# Patient Record
(demographics unavailable — no encounter records)

---

## 2017-01-01 NOTE — PN
Date/Time of Note


Date/Time of Note


DATE: 17 


TIME: 12:32





[place] SOAP


Subjective Findings


Other Findings


Infant is breast-feeding well at 3.9% weight loss void and stool normal.


Mother with questionable GBS treated with 1 dose of antibiotics 1 hour prior to 

discharge no clinical signs or symptoms of infection


We will check bilirubin prior to discharge


Hearing screen and congenital heart disease screen prior to discharge





Vital Signs


Vital Signs





 Vital Signs








  Date Time  Temp Pulse Resp B/P Pulse Ox O2 Delivery O2 Flow Rate FiO2


 


17 08:00 99.6 140 42     





NPASS Score-Pain: 0





Physical Exam


HEENT:  [place] open,soft,flat, Normocephalic


Lungs:  Clear to auscultation


Heart:  Regular R&R, No murmur


Abdomen:  Soft, No hepatosplenomegaly, No masses


Skin:  No rashes, [place]





Assessment


Term [place]:  Boy


Assessment:  AGA,  Jaundice





Plan


Routine  care


Bilirubin prior to discharge


Monitor for clinical signs or symptoms of infection


Hearing screen and congenital heart disease screen prior to discharge











YUSEF NOLEN MD May 27, 2017 12:34

## 2017-01-01 NOTE — ERD
ER Documentation


Chief Complaint


Date/Time


DATE: 9/8/17 


TIME: 21:34


Chief Complaint


Fever and Diarrhea x2 days





HPI


3-month-old male otherwise healthy and up-to-date vaccinations presents with 

his mother for fever, cough, diarrhea and runny nose for 2 days.  Mother states 

that the temperature maximum was 100.1, and he received Tylenol which is about 3

-1/2 hours ago.  She reports up to 4 episodes of diarrhea, making wet diapers 

and taking breast milk well.  Child has not had any episodes of vomiting.





ROS


All systems reviewed and are negative except as per history of present illness.





Medications


Home Meds


Active Scripts


Electrolytes (Pedialyte Advanced Care) 1,000 Ml Solution, 100 ML PO TID, #1000 

ML


   Prov:ALICIA MARTÍNEZ PA-C         9/8/17


Acetaminophen* (Acetaminophen* Susp) 160 Mg/5 Ml Oral.susp, 3 ML PO Q4H Y for 

PAIN OR FEVER, #1 BOTTLE


   Prov:ALICIA MARTÍNEZ PA-C         9/8/17


Glycerin* (Glycerin (Pediatric)*) 1 Each Supp.rect, 1 EACH [place] DAILY, #3 

SUPP.RECT


   Prov:OSCAR CARVALHO         7/9/17





Allergies


Allergies:  


Coded Allergies:  


     No Known Allergy (Unverified , 7/14/17)





PMhx/Soc


History of Surgery:  No


Anesthesia Reaction:  No


Hx Neurological Disorder:  No


Hx Respiratory Disorders:  No


Hx Cardiac Disorders:  No


Hx Psychiatric Problems:  No


Hx Alcohol Use:  No


Hx Substance Use:  No


Hx Tobacco Use:  No


Smoking Status:  Never smoker





Physical Exam


Vitals





Vital Signs








  Date Time  Temp Pulse Resp B/P Pulse Ox O2 Delivery O2 Flow Rate FiO2


 


9/8/17 19:33 99.0 165 28  100   








Physical Exam


 Const:      Well-developed, well-nourished, in no acute distress.


HEENT:     Atraumatic. Normal Conjunctiva. TM's normal bilaterally, clear 

oropharynx. Supple. Full range of motion.  No meningismus.


 Resp:       Clear to auscultation bilaterally


 Cardio:    Regular rate and rhythm, no murmurs


 Abd:         Soft, non tender, non distended. Normal bowel sounds. No McBurney'

s point tenderness. No guarding or rigidity. No peritoneal signs.


 Skin:        No petechia or rashes


 Back:      No midline or flank tenderness


 Ext:        No cyanosis, or edema


 Neur:      Awake and alert, appropriate for age





Procedures/MDM


The patient is a 3-month-old female who comes in with diarrhea, cough, fever, 

rhinorrhea for 2 days.  Patient appears well, without signs of dehydration.  

The patient has a differential diagnosis of a viral upper respiratory infection

, bacterial upper respiratory infection, bronchitis, pneumonia, pharyngitis, 

laryngitis, epiglottitis, croup, pneumonia. Patient has a normal pulmonary 

examination, clear breath sounds, normal pulse oximetry, with no corrective 

measures needed at this time. Fluids, rest, antipyretics were encouraged.





Departure


Diagnosis:  


 Primary Impression:  


 Diarrhea


Condition:  Good


Patient Instructions:  Diarrhea, Viral (Infant/Toddler)











ALICIA MARTÍNEZ PA-C Sep 8, 2017 21:36

## 2017-01-01 NOTE — RADRPT
PROCEDURE:   XR Abdomen. 

 

CLINICAL INDICATION:   Abdomen pain. 

 

TECHNIQUE:   AP supine abdomen x-ray. 

 

COMPARISON:   None. 

 

FINDINGS:

There is a large amount of stool throughout the colon consistent with constipation.  The bowel gas p
attern is otherwise normal. 

There is no evidence of obstruction.  

There are no abnormal calcifications overlying the urinary tracts. 

The osseus structures are unremarkable.   

 

IMPRESSION:

1.  Constipation.

2.  Otherwise unremarkable abdomen radiograph.  

 

RPTAT: QQ

_____________________________________________ 

.Balbir Quiñones MD, MD           Date    Time 

Electronically viewed and signed by .Balbir Quiñones MD, MD on 2017 23:14 

 

D:  2017 23:14  T:  2017 23:14

.R/

## 2017-01-01 NOTE — ERD
ER Documentation


Chief Complaint


Chief Complaint


cough x 4 days, watery eyes





HPI


This 6 month old male BIB parents for evaluation for cough and fever





ROS


All systems reviewed and are negative except as per history of present illness.





Medications


Home Meds


Active Scripts


Acetaminophen* (Acetaminophen* Susp) 160 Mg/5 Ml Oral.susp, 4.5 ML PO Q4H Y for 

PAIN OR FEVER, #1 BOTTLE


   Prov:HOMERO SCHULTZ         11/26/17


Acetaminophen* (Acetaminophen* Susp) 160 Mg/5 Ml Oral.susp, 4 ML PO Q6H Y for 

PAIN OR FEVER, #1 BOTTLE


   Prov:VINICIO DANIELS PA-C         11/12/17


Electrolytes (Pedialyte Advanced Care) 1,000 Ml Solution, 100 ML PO TID, #1000 

ML


   Prov:ALICIA MARTÍNEZ PA-C         9/8/17


Acetaminophen* (Acetaminophen* Susp) 160 Mg/5 Ml Oral.susp, 3 ML PO Q4H Y for 

PAIN OR FEVER, #1 BOTTLE


   Prov:ALICIA MARTÍNEZ PA-C         9/8/17


Glycerin* (Glycerin (Pediatric)*) 1 Each Supp.rect, 1 EACH [place] DAILY, #3 

SUPP.RECT


   Prov:OSCAR CARVALHO         7/9/17





Allergies


Allergies:  


Coded Allergies:  


     No Known Allergy (Unverified , 7/14/17)





PMhx/Soc


History of Surgery:  No


Anesthesia Reaction:  No


Hx Neurological Disorder:  No


Hx Respiratory Disorders:  No


Hx Cardiac Disorders:  No


Hx Psychiatric Problems:  No


Hx Alcohol Use:  No


Hx Substance Use:  No


Hx Tobacco Use:  No


Smoking Status:  Never smoker





Physical Exam


Vitals





Vital Signs








  Date Time  Temp Pulse Resp B/P Pulse Ox O2 Delivery O2 Flow Rate FiO2


 


11/27/17 00:51 99.5       


 


11/27/17 00:30 100.7       


 


11/26/17 23:41 102.4       


 


11/26/17 22:50 102.4       


 


11/26/17 20:04 97.8 133 20  100   








Physical Exam


Const:    Well-nourished well-appearing well-hydrated-month-old male patient in 

no acute distress, patient is cooperative during exam, alert, breast-feeding 

without difficulty.


Head:   [place] flat


Eyes:    Normal Conjunctiva, right eye crusted shut with yellow discharge.  

Lids easily opened with gentle pressure applied


ENT:    Bilateral tympanic membranes partially obstructed with cerumen, no 

erythema noted, no fluid level, nasal mucosa is wet, pharynx is pink, tongue is 

midline, mucosas are moist.


Neck:               Full range of motion..~ No meningismus.


Resp:   Chest rises and falls symmetrically, no intercostal retractions, no 

upper airway stridor clear to auscultation bilaterally no wheezes or rhonchi


Neur:    Awake and alert age-appropriate


Psych:    Normal Mood and Affect


Results 24 hrs








 Laboratory Tests








Test


  11/27/17


01:24


 


Bedside Urine pH (LAB) 6.0 


 


Bedside Urine Protein (LAB) 1+ 


 


Bedside Urine Glucose (UA) Negative 


 


Bedside Urine Ketones (LAB) Negative 


 


Bedside Urine Blood 1+ 


 


Bedside Urine Nitrite (LAB) Negative 


 


Bedside Urine Leukocyte


Esterase (L Negative 


 








 Current Medications








 Medications


  (Trade)  Dose


 Ordered  Sig/Lupillo


 Route


 PRN Reason  Start Time


 Stop Time Status Last Admin


Dose Admin


 


 Acetaminophen


  (Tylenol Supp)  186 mg  ONCE  STAT


 MN


   11/26/17 22:50


 11/26/17 22:51 DC 11/26/17 22:54


 


 


 Sodium Chloride


  (NS)  180 ml  ONCE  ONCE


 IV*


   11/27/17 00:00


 11/27/17 00:01 DC  


 


 


 Ibuprofen


  (Motrin Liquid


  (Ped))  95 mg  ONCE  STAT


 PO


   11/26/17 23:45


 11/26/17 23:48 DC 11/26/17 23:51


 











Procedures/MDM


This 6-month-old male patient presents to emergency department along with 

brother for evaluation of cough, and fever.  Patient is interactive, alert, and 

fussy on exam, emergency room course includes history and physical exam 

unremarkable for pulmonary symptoms.  Patient's skin is hot to touch with a 

documented normal temperature, rectal temperature ordered, patient is febrile 

at 102.4.  Rectal Tylenol given, patient reassessed with little change in 

symptoms, ibuprofen  given.  Straight cath for urinalysis, urinalysis negative 

for evidence of infection, post ibuprofen assessment, fever is starting to 

reduce.  Patient remains in emergency department for additional 30 minutes for 

monitoring.  Plan to discharge home with diagnosis of fever, cough.  Continue 

ibuprofen, fluids, rest, return to emergency department if fever fails to 

improve as anticipated, follow-up with pediatrician in 24 hours.  Patient is 

stable with no new complaints during ER course, clinically there is no current 

evidence to suggest meningitis, sepsis, acute abdomen,, bowel obstruction 

influenza A, influenza B, pneumonia, urinary tract infection or any other 

emergent condition appearing to require further evaluation or hospitalization.  

I feel the patient is stable for discharge at this time.  I have discussed 

results, examination findings, the treatment plan with the patient and family 

present prior to discharge.  Indications for emergent reevaluation, side 

effects of medication were also discussed.  All questions were answered.  

Patient verbalizes understanding and agrees with plan of care.





Departure


Diagnosis:  


 Primary Impression:  


 Fever


 Fever type:  unspecified  Qualified Code:  R50.9 - Fever, unspecified fever 

cause


Condition:  Good


Patient Instructions:  Cough, Chronic, Uncertain Cause (Child)





Additional Instructions:  


Thank you for for coming to [facility] for your care today. 

Please ask your nurse or provider if you have questions about your care today 

and do not leave until all your questions have been answered.  Please use any 

medications given as directed and follow-up with your doctor (or the doctor you 

were referred to) in the next 2-3 days. If you do not have a primary care 

doctor you [place] [facility] [place] You may also 

use motrin and tylenol as needed for fever and/or pain unless instructed 

otherwise by your provider or nurse. Indications for more urgent follow-up have 

been discussed, but you may return to the Emergency Department at ANY time for 

any worrisome or worsening symptoms.





If you have abdominal pain, please know that no test or exam you received is 

perfect and you should follow up within 8 hours for continued pain.





If you had any imaging studies today, such as an X-Ray or CT Scan, these 

studies will be reviewed later by a radiologist. You will be called if there 

are important findings that were not identified today, so make sure the contact 

information you provided at registration is correct.





If you received any narcotic pain control medicine today, such as Vicodin, 

Morphine or Dilaudid, your coordination and judgment may be affected for a 

number of hours. Please do not drive or operate heavy machinery, and you may 

want someone to assist you at home. If you were given a prescription for 

narcotic medication, be aware that it is very addictive- use sparingly and only 

if necessary.











HOMERO SCHULTZ Nov 26, 2017 20:51

## 2017-01-01 NOTE — PD.NBNDCI
Provider Discharge Instruction


Pediatrician Information


Clinic Information


Dr Ulloa








Follow-up with Physician:   2





 3





 Day/Days











Diet


Breast Feeding Mothers:  Breast Feed Ad Shireen





Additional Instructions


Additional Infomation


Discharge home


Breast-feeding ad shireen. on demand at least every 3 hours


No medication


Follow-up with pediatrician in 2 or 3 days in the office of HEIDE Salas May 28, 2017 13:16

## 2017-01-01 NOTE — ERD
ER Documentation


Chief Complaint


Chief Complaint


FEVER AT HOME +COUGH/CONGESTION





HPI


5 month 17-day-old male patient with no significant past medical history 

presents to the ED complaining of fever, cough that started 4 days ago.  

Patient is up-to-date with his vaccinations.  Patient is eating appropriately, 

tolerating oral intake, has normal bowel movements and good urine output.  

Denies any wheezing, shortness of breath, nausea, vomiting, diarrhea, dysuria, 

urgency, frequency.





ROS


All systems reviewed and are negative except as per history of present illness.





Medications


Home Meds


Active Scripts


Acetaminophen* (Acetaminophen* Susp) 160 Mg/5 Ml Oral.susp, 4 ML PO Q6H Y for 

PAIN OR FEVER, #1 BOTTLE


   Prov:VINICIO DANIELS PA-C         11/12/17


Electrolytes (Pedialyte Advanced Care) 1,000 Ml Solution, 100 ML PO TID, #1000 

ML


   Prov:ALICIA MARTÍNEZ PA-C         9/8/17


Acetaminophen* (Acetaminophen* Susp) 160 Mg/5 Ml Oral.susp, 3 ML PO Q4H Y for 

PAIN OR FEVER, #1 BOTTLE


   Prov:ALICIA MARTÍNEZ PA-C         9/8/17


Glycerin* (Glycerin (Pediatric)*) 1 Each Supp.rect, 1 EACH [place] DAILY, #3 

SUPP.RECT


   Prov:OSCAR CARVALHO         7/9/17





Allergies


Allergies:  


Coded Allergies:  


     No Known Allergy (Unverified , 7/14/17)





PMhx/Soc


History of Surgery:  No


Anesthesia Reaction:  No


Hx Neurological Disorder:  No


Hx Respiratory Disorders:  No


Hx Cardiac Disorders:  No


Hx Psychiatric Problems:  No


Hx Alcohol Use:  No


Hx Substance Use:  No


Hx Tobacco Use:  No


Smoking Status:  Never smoker





Physical Exam


Vitals


Vital Signs








  Date Time  Temp Pulse Resp B/P Pulse Ox O2 Delivery O2 Flow Rate FiO2


 


11/12/17 16:41 99.2 149 32  99   








Physical Exam


Const: Non-ill-appearing, well-nourished. In no acute distress.


Head: Atraumatic, normocephalic 


Eyes: Normal Conjunctiva without injection. No purulent discharge. PERRL. EOMI 


ENT: Normal external ear. Ear canal without erythema. Tympanic membrane pearly 

gray without effusion or bulging. Nasal canal clear with normal turbinates. 

Moist oropharynx without tonsillar exudates. Non-erythematous pharynx. Uvula 

midline. No drooling.  No trismus. 


Neck: Full range of motion. No meningismus. No cervical lymphadenopathy. 


Resp: Clear to auscultation bilaterally. No wheezing, rhonchi, rales, or 

crackles. No accessory muscle use. No retractions.


Cardio: Regular rate and rhythm.  No murmurs, rubs or gallops.


Abd: Soft, non tender, non distended. Normal bowel sounds.  No palpable masses.

  No rebound tenderness.  No guarding.  


Skin: No petechiae or rashes


Back: No midline tenderness. No CVA tenderness.


Ext: No cyanosis, or edema. 


Neur: Awake and alert. 


Psych: Normal Mood and Affect





Procedures/MDM


5 month 17-day-old male patient with no significant past medical history 

presents to the ED complaining of cough, congestion, fever.  Patient is 

afebrile and nontoxic-appearing.  Patient has normal vital signs.  This patient 

presents to the ED with symptoms consistent with a viral acute upper 

respiratory infection.  Patient is afebrile and has normal vital signs.  Patient

's physical exam include lungs which were clear to auscultation and a normal 

pulse oximetry. There is a low suspicion for a croup, pneumonia, pneumothorax, 

cardiac tamponade, peritonsillar abscess, foreign body aspiration, mastoiditis, 

retropharyngeal abscess, epiglottitis, meningitis, sepsis or other emergent 

conditions. 





Discharge medications: Tylenol


Follow up with primary care physician in 1-2 days. Instructed patient to return 

to the ED sooner for any worsening symptoms. Patient's questions were answered. 

Patient understood and agreed with discharge plan. Patient discharged stable.





Departure


Diagnosis:  


 Primary Impression:  


 Cough


 Additional Impression:  


 Fever


 Fever type:  unspecified  Qualified Code:  R50.9 - Fever, unspecified fever 

cause


Condition:  Stable


Patient Instructions:  Uri, Viral, No Abx (Child)


Referrals:  


COMMUNITY CLINICS


YOU HAVE RECEIVED A MEDICAL SCREENING EXAM AND THE RESULTS INDICATE THAT YOU DO 

NOT HAVE A CONDITION THAT REQUIRES URGENT TREATMENT IN THE EMERGENCY DEPARTMENT.





FURTHER EVALUATION AND TREATMENT OF YOUR CONDITION CAN WAIT UNTIL YOU ARE SEEN 

IN YOUR DOCTORS OFFICE WITHIN THE NEXT 1-2 DAYS. IT IS YOUR RESPONSIBILITY TO 

MAKE AN APPOINTMENT FOR FOLOW-UP CARE.





IF YOU HAVE A PRIMARY DOCTOR


--you should call your primary doctor and schedule an appointment





IF YOU DO NOT HAVE A PRIMARY DOCTOR YOU CAN CALL OUR PHYSICIAN REFERRAL HOTLINE 

AT


 [phone] 





IF YOU CAN NOT AFFORD TO SEE A PHYSICIAN YOU CAN CHOSE FROM THE FOLLOWING 

[facility] CLINICS





[facility] [phone] [address] [place]





[facility] [phone] [address] [facility]





[facility] [phone] [address] [facility] [phone] [address] [facility] [phone] [address] [facility]. [facility] [phone] [address] [facility]. [facility]


YOU HAVE RECEIVED A MEDICAL SCREENING EXAM AND THE RESULTS INDICATE THAT YOU DO 

NOT HAVE A CONDITION THAT REQUIRES URGENT TREATMENT IN THE EMERGENCY DEPARTMENT.





FURTHER EVALUATION AND TREATMENT OF YOUR CONDITION CAN WAIT UNTIL YOU ARE SEEN 

IN YOUR DOCTORS OFFICE WITHIN THE NEXT 1-2 DAYS. IT IS YOUR RESPONSIBILITY TO 

MAKE AN APPOINTMENT FOR FOLOW-UP CARE.





IF YOU HAVE A PRIMARY DOCTOR


--you should call your primary doctor and schedule and appointment





IF YOU DO NOT HAVE A PRIMARY DOCTOR YOU CAN CALL OUR PHYSICIAN REFERRAL HOTLINE 

AT [phone].





IF YOU CAN NOT AFFORD TO SEE A PHYSICIAN YOU CAN CHOSE FROM THE FOLLOWING 

[facility] INSTITUTIONS:





[facility]


[address] [place] [address]





[facility]


[address] [place] [address]





[facility]


[address] [place] [address]





[facility] URGENT CARE/SPECIALTIES





Additional Instructions:  


Call your primary care doctor TOMORROW for an appointment during the next 2-3 

days.See the doctor sooner or return here if your condition worsens before your 

appointment time.











VINICIO DANIELS PA-C Nov 12, 2017 19:34


VINICIO DANIELS PA-C Nov 12, 2017 19:34

## 2017-01-01 NOTE — HP
Date/Time of Note


Date/Time of Note


DATE: 17 


TIME: 16:21





[place] Physical Examination


Infant History


YOB: 2017Time of Birth:  918


Sex:


male


Type of Delivery:  NORMAL VAGINAL DELIVERYBirth Weight (g):  3590Newborn Head 

Circumference:  33.0Length (in):  20.00APGAR Score:  9.9





Maternal Labs


Maternal Hepatitis B:  Negative


Maternal RPR/VDRL:  Nonreactive


Maternal Group Beta Strep:  Negative


Maternal Abx # of Dose(s):  1


Maternal Antibiotic last date:  May 26, 2017


Maternal Antibiotic Last time:  852


Mother's Blood Type:  A Positive





Admission Vital Signs





 Vital Signs








  Date Time  Temp Pulse Resp B/P Pulse Ox O2 Delivery O2 Flow Rate FiO2


 


17 11:00  124 40     











Labs/Micro





Blood Bank








Test


  17


09:18


 


Blood Type A POSITIVE 


 


Direct Antiglobulin Test


([place]) NEGATIVE 


 

















ROMEL CARR May 26, 2017 16:21

## 2017-01-01 NOTE — ERD
ER Documentation


Chief Complaint


Chief Complaint


1 diaper change today; cough, congestion since yesterday





HPI


6-month-old male presents here to emergency department for reevaluation.  

Patient was seen here yesterday with diagnosed with upper respiratory tract 

infection, started to have vomiting and diarrhea today.  Patient also has 

bilateral eye purulent discharge, both eyes are stuck in the morning.  Patient 

fever is more controlled at this time.  Patient has loss of appetite, not 

eating that much, also was vomiting, had only one diaper change today, but also 

had a urine output in the morning.





ROS


All systems reviewed and are negative except as per history of present illness.





Medications


Home Meds


Active Scripts


Ondansetron Hcl* (Ondansetron Hcl* Liq) 4 Mg/5 Ml Solution, 1 ML PO Q6H Y for 

NAUSEA AND/OR VOMITING, #2 OZ


   Prov:ONIEL BACK NP         11/28/17


Naphazoline-Pheniramine* (Visine-A*) 15 Ml Drops, 2 DROP BOTH EYES Q4H Y for 

RED EYES, #1 BOT


   Prov:ONIEL BACK NP         11/28/17


Polymyxin B Sulfate-TMP* (Polymyxin B-TMP Eye Drops*) 10 Ml Drops, 1 DROP BOTH 

EYES QID for 7 Days, EA


   Prov:ONIEL BACK NP         11/28/17


Acetaminophen* (Acetaminophen* Susp) 160 Mg/5 Ml Oral.susp, 4.5 ML PO Q4H Y for 

PAIN OR FEVER, #1 BOTTLE


   Prov:HOMERO SCHULTZ         11/26/17


Acetaminophen* (Acetaminophen* Susp) 160 Mg/5 Ml Oral.susp, 4 ML PO Q6H Y for 

PAIN OR FEVER, #1 BOTTLE


   Prov:VINICIO DANIELS PA-C         11/12/17


Electrolytes (Pedialyte Advanced Care) 1,000 Ml Solution, 100 ML PO TID, #1000 

ML


   Prov:ALICIA MARTÍNEZ PA-C         9/8/17


Acetaminophen* (Acetaminophen* Susp) 160 Mg/5 Ml Oral.susp, 3 ML PO Q4H Y for 

PAIN OR FEVER, #1 BOTTLE


   Prov:ALICIA MARTÍNEZ PA-C         9/8/17


Glycerin* (Glycerin (Pediatric)*) 1 Each Supp.rect, 1 EACH ND DAILY, #3 

SUPP.RECT


   Prov:OSCAR CARVALHO         7/9/17





Allergies


Allergies:  


Coded Allergies:  


     No Known Allergy (Unverified , 7/14/17)





PMhx/Soc


Immunization: Up-to-date


Medical and Surgical Hx:  pt denies Medical Hx, pt denies Surgical Hx


History of Surgery:  No


Anesthesia Reaction:  No


Hx Neurological Disorder:  No


Hx Respiratory Disorders:  No


Hx Cardiac Disorders:  No


Hx Psychiatric Problems:  No


Hx Alcohol Use:  No


Hx Substance Use:  No


Hx Tobacco Use:  No





FmHx


Family History:  No coronary disease, No diabetes, No other





Physical Exam


Vitals





Vital Signs








  Date Time  Temp Pulse Resp B/P Pulse Ox O2 Delivery O2 Flow Rate FiO2


 


11/27/17 23:14 97.6 136 26  96   








Physical Exam


GENERAL:  The child is well developed and nourished for age, interactive and 

vigorous appearing. No acute distress and nontoxic.


HEENT: Atraumatic.  Her eyes are PERRLA EOM intact, noted purulent discharge 

from both eyes, bilateral eye conjunctiva noted to be erythematous.  Ears: 

Normal tympanic membrane, no erythema or bulging. No ear canal swelling. No ear 

discharge. Nose: normal nasal turbinates, no erythema or swelling. Normal nasal 

discharge. Throat: oropharynx clear. No tonsillar swelling or tonsillar 

exudates. No lymphadenopathy.


LUNGS:  Clear to auscultation.  No accessory muscle use.  No wheezing, no 

crackles. No signs or symptoms of respiratory distress.  


HEART:  Regular rate and rhythm. No murmurs, clicks, rubs or gallops.


ABDOMEN:  Soft, nontender and nondistended. Bowel sounds hyperactive. No 

rebound or guarding. No gross peritoneal signs. No Roberts or McBurney point 

tenderness. No gross masses.


BACK:  No midline tenderness, no costovertebral tenderness.


EXTREMITIES:  There is no peripheral cyanosis or edema. No focal pain or 

notable trauma. Full range of motion. Good capillary refill.


NEURO:  The patient moves all 4 extremities with 5/5 strength. Cranial nerves 

are grossly intact. Normal mental status for age. 


SKIN:  There is no apparent rash, petechiae, erythema or swelling. Good skin 

turgor.





Procedures/MDM


Medical decision making: Patient symptoms was likely is consistent with 

bacterial conjunctivitis, no symptoms of any other eye emergencies at this 

time.  Patient also has viral syndrome, was given Zofran for the vomiting, was 

advised to continue taking Pedialyte.  At this time, no symptoms of dehydration

, no active vomiting.  Fevers controlled.  Patient's appears well and is 

hemodynamically stable.  Prescription was given for Zofran, Polytrim eyedrops, 

Naphcon ophthalmic solution, is advised to follow-up with primary care doctor 

in 2-3 days for reevaluation of symptoms.  Patient was advised to return to 

emergency department for any worsening symptoms.





Disposition: Home.  Stable





Departure


Diagnosis:  


 Primary Impression:  


 Bacterial conjunctivitis of both eyes


 Additional Impression:  


 Viral syndrome


Condition:  Stable


Patient Instructions:  Conjunctivitis Caused by Infection, Viral Syndrome (Child

)











ONIEL BACK NP Nov 28, 2017 01:54

## 2017-01-01 NOTE — DS
Date/Time of Note


Date/Time of Note


DATE: 17 


TIME: 13:14





[place] SOAP


Subjective Findings


Other Findings


Normal spontaneous vaginal delivery 3590 g birth weight at 39-2/7 weeks.  Group 

B strep was unknown received 1 dose of antibiotics and has now been observed 

for 48 hours


Breast-feeding all feeding had good wet diapers and stool the weight is 3315 

down to 7.6%.


Past CCHD test and hearing screen received hepatitis B vaccine


Bilirubin is 9.6, blood type A+ [place] negative.  Has cephalic hematoma on the 

left parietal side.





Vital Signs


Vital Signs





 Vital Signs








  Date Time  Temp Pulse Resp B/P Pulse Ox O2 Delivery O2 Flow Rate FiO2


 


17 12:30 98.2 132 41     


 


17 08:10 98.2 130 40     





NPASS Score-Pain: 0





Physical Exam


HEENT:  [place] open,soft,flat, Normocephalic, Cephalohematoma, Other (Left 

parietal cephalic hematoma small.)


Lungs:  Clear to auscultation


Heart:  Regular R&R, No murmur


Abdomen:  Soft, No hepatosplenomegaly, No masses, Other ([place] stump dry.  

Extremities normal perfusion and pulses, hips normal.  Spine straight and 

closed no pits or dimples)


Skin:  No rashes, Other (Minimal jaundice)





Assessment


Term [place]:  Boy


Assessment:  AGA, Cephalohematoma, Other





Plan


Discharge home


Breast-feeding ad claudia. on demand at least every 3 hours


No medication


Follow-up with pediatrician in 2 or 3 days in the office of Dr. Ulloa





Pending Labs/Cultures





Laboratory Tests








Test


  17


06:31


 


Total Bilirubin


  9.6mg/dl


(1.5-10.5)


 


Direct Bilirubin


  0.00mg/dl


(0.05-1.20)


 


Indirect Bilirubin


  9.6mg/dl


(0.6-10.5)











Condition on Discharge


 Condition:  Stable











HEIDE CAMP May 28, 2017 13:16

## 2017-01-01 NOTE — ERD
ER Documentation


Chief Complaint


Date/Time


DATE: 17 


TIME: 22:45


Chief Complaint


NO BOWEL MOVEMENT X 3 DAYS





HPI


This is a 1 month 13-day-old male brought in by mother for constipation.  She 

is a young man one small bowel movement about 3 days.  Patient is breast-fed.  

Normal spontaneous vaginal delivery.  No comp occasions of birth.  No fever no 

vomiting.  No other current complaints.  Child eating and acting normally





ROS


All systems reviewed and are negative except as per history of present illness.





Medications


Home Meds


No Active Prescriptions or Reported Meds





Allergies


Allergies:  


Coded Allergies:  


     No Known Allergy (Unverified , 17)





PMhx/Soc


Medical and Surgical Hx:  pt denies Medical Hx, pt denies Surgical Hx


Hx Psychiatric Problems:  No


Hx Alcohol Use:  No


Hx Substance Use:  No


Hx Tobacco Use:  No


Smoking Status:  Never smoker





Physical Exam


Vitals





Vital Signs








  Date Time  Temp Pulse Resp B/P Pulse Ox O2 Delivery O2 Flow Rate FiO2


 


17 21:46 98.1 162 28  99   








Physical Exam


Const:  []


Head:   Atraumatic 


Eyes:    Normal Conjunctiva


ENT:    Normal External Ears, Nose and Mouth.


Neck:               Full range of motion..~ No meningismus.


Resp:    Clear to auscultation bilaterally


Cardio:    Regular rate and rhythm, no murmurs


Abd:    Soft, non tender, non distended. Normal bowel sounds


Skin:    No petechiae or rashes


Back:    No midline or flank tenderness


Ext:    No cyanosis, or edema


Neur:    Awake and alert


Psych:    Normal Mood and Affect





Procedures/MDM


X-ray Abdomen 1V Interpreted by me:


Free Air:   [None]


Bowel Gas:    [Nonspecific]


Soft Tissue:   [Normal]





Medical decision-makin-month-old with acute constipation.  At this point 

clinically stable.  Child well-appearing, nontoxic, afebrile.  Tolerating p.o. 

With moist mucous membranes.  Discharge with glycerin suppositories.  Follow-up 

with PCP.  Return for worsening symptoms.  Follow-up in 8 hours for serial 

abdominal exams.





Departure


Diagnosis:  


 Primary Impression:  


 Constipation


 Constipation type:  unspecified constipation type  Qualified Code:  K59.00 - 

Constipation, unspecified constipation type


Condition:  Stable











OSCAR CARVALHO 2017 22:46

## 2019-01-24 NOTE — ERD
ER Documentation


Chief Complaint


Chief Complaint





bib mom for fever , sore throat , cough x 4 days





HPI


1-year-old male complaining of fever sore throat and cough.  This is been going 


on for 4 days.  Last dose of Motrin was given 4 hours prior to my evaluation.  


Has no sick contacts.  No vomiting.  No abdominal pain.  Denies medical 


problems.  NKDA.  Surgical history denies.  Up-to-date on vaccinations





ROS


All systems reviewed and are negative except as per history of present illness.





Medications


Home Meds


Active Scripts


Acetaminophen* (Acetaminophen* Susp) 160 Mg/5 Ml Oral.susp, 5 ML PO Q4H PRN for 


PAIN OR FEVER MDD 5, #1 BOTTLE


   Prov:MIGDALIA LUCERO PA-C         1/24/19


Ibuprofen (Ibuprofen) 100 Mg/5 Ml Oral.susp, 5 ML PO Q6H PRN for PAIN AND OR 


ELEVATED TEMP, #4 OZ


   Prov:MIGDALIA LUCERO PA-C         1/24/19


Acetaminophen* (Acetaminophen* Susp) 160 Mg/5 Ml Oral.susp, 5 ML PO Q4H PRN for 


PAIN OR FEVER MDD 5, #1 BOTTLE


   Prov:JUHI ORELLANA MD         11/20/18


Acetaminophen* (Acetaminophen* Susp) 160 Mg/5 Ml Oral.susp, 5.5 ML PO Q4H PRN 


for PAIN OR FEVER MDD 5, #1 BOTTLE


   Prov:RAJESH ALVAREZC         7/21/18


Ibuprofen (MOTRIN LIQUID (PED)) 20 Mg/Ml Susp, 6 ML PO Q6, #4 OZ


   Prov:RAJESH ALVAREZ-C         7/21/18


Electrolyte,Oral (Pedialyte) 1,000 Ml Solution, 100 ML PO Q6 PRN for DIARRHEA, 


#1000 ML


   Prov:RAJESH ALVAREZ-C         7/21/18


Electrolyte,Oral (Pedialyte) 1,000 Ml Solution, 100 ML PO Q6 PRN for VOMITTING, 


#1000 ML


   Prov:VINICIO DANIELS PA-C         3/23/18


Acetaminophen* (Acetaminophen* Susp) 160 Mg/5 Ml Oral.susp, 5 ML PO Q6H PRN for 


PAIN OR FEVER MDD 5, #1 BOTTLE


   Prov:VINICIO DANIELSC         3/23/18


Ibuprofen (Ibuprofen) 100 Mg/5 Ml Oral.susp, 5 ML PO Q6H PRN for PAIN AND OR 


ELEVATED TEMP, #4 OZ


   Prov:VINICIO DANIELS PA-C         3/23/18


Electrolyte,Oral (Pedialyte) 1,000 Ml Solution, 100 ML PO Q6 PRN for VOMITTING, 


#1000 ML


   Prov:VINICIO DANIELS PA-C         1/5/18


Acetaminophen* (Acetaminophen* Susp) 160 Mg/5 Ml Oral.susp, 4.5 ML PO Q6H PRN 


for PAIN OR FEVER MDD 5, #1 BOTTLE


   Prov:VINICIO DANIELS PA-C         1/5/18


Ondansetron Hcl* (Ondansetron Hcl* Liq) 4 Mg/5 Ml Solution, 1 ML PO Q6H PRN for 


NAUSEA AND/OR VOMITING, #2 OZ


   Prov:ONIEL BACK NP         11/28/17


Naphazoline-Pheniramine* (Visine-A*) 15 Ml Drops, 2 DROP BOTH EYES Q4H PRN for 


RED EYES, #1 BOT


   Prov:ONIEL BACK NP         11/28/17


Polymyxin B Sulfate-TMP* (Polymyxin B-TMP Eye Drops*) 10 Ml Drops, 1 DROP BOTH 


EYES QID for 7 Days, EA


   Prov:ONIEL BACK NP         11/28/17


Acetaminophen* (Acetaminophen* Susp) 160 Mg/5 Ml Oral.susp, 4.5 ML PO Q4H PRN 


for PAIN OR FEVER MDD 5, #1 BOTTLE


   Prov:MARIONHOMERO         11/26/17


Acetaminophen* (Acetaminophen* Susp) 160 Mg/5 Ml Oral.susp, 4 ML PO Q6H PRN for 


PAIN OR FEVER MDD 5, #1 BOTTLE


   Prov:VINICIO DANIELS PA-C         11/12/17


Electrolytes (Pedialyte Advanced Care) 1,000 Ml Solution, 100 ML PO TID, #1000 


ML


   Prov:ALICIA MARTÍNEZ PA-C         9/8/17


Acetaminophen* (Acetaminophen* Susp) 160 Mg/5 Ml Oral.susp, 3 ML PO Q4H PRN for 


PAIN OR FEVER MDD 5, #1 BOTTLE


   Prov:ALICIA MARTÍNEZ PA-C         9/8/17


Glycerin* (Glycerin (Pediatric)*) 1 Each Supp.rect, 1 EACH [place] DAILY, #3 


SUPP.RECT


   Prov:OSCAR CARVALHO         7/9/17





Allergies


Allergies:  


Coded Allergies:  


     No Known Allergy (Unverified , 1/5/18)





PMhx/Soc


History of Surgery:  No


Anesthesia Reaction:  No


Hx Neurological Disorder:  No


Hx Respiratory Disorders:  No


Hx Cardiac Disorders:  Yes (BRONCHITIS)


Hx Psychiatric Problems:  No


Hx Alcohol Use:  No


Hx Substance Use:  No


Hx Tobacco Use:  No


Smoking Status:  Never smoker





FmHx


Family History:  No diabetes, No coronary disease, No other





Physical Exam


Vitals





Vital Signs


  Date      Temp  Pulse  Resp  B/P (MAP)  Pulse Ox  O2          O2 Flow     FiO2


Time                                                Delivery    Rate


   1/24/19  98.9    119    24                  100


     08:59





Physical Exam


GENERAL: The patient is well-appearing, well-nourished, in no acute distress


HEENT: Atraumatic.  Conjunctivae are pink.  Pupils equal, round, and reactive to


light.  There is no scleral icterus.  Tympanic membranes clear bilaterally.  


Oropharynx clear


NECK: C-spine is soft and supple.  There is no meningismus.  There is no 


cervical lymphadenopathy. 


CHEST: Clear to auscultation bilaterally.  There are no rales, wheezes or 


rhonchi.


HEART: Regular rate and rhythm.  No murmurs, clicks, rubs or gallops.


ABDOMEN:Soft, nontender and nondistended.  Good bowel sounds.  No rebound or 


guarding.  No gross peritonitis.  No gross organomegaly or masses.





Procedures/MDM


MDM: 1-year-old male presenting with URI symptoms.  Exam is non-concerning and 


patient is nontoxic appearing.  I believe patient likely has viral syndrome.  I 


have low suspicion for meningitis or sepsis.  I have low suspicion for bacterial


HEENT infection.  I have low suspicion for pneumonia.  Patient is discharged 


with supportive medications and told to follow-up with primary care within 1-2 


days for close evaluation.  Patient is told if symptoms change or worsen to 


immediately return to the ER.  All questions answered at discharge





Departure


Diagnosis:  


   Primary Impression:  


   Fever


Condition:  Stable


Patient Instructions:  Fever Control (Child)


Referrals:  


JOVANNY DIOP (PCP)





Additional Instructions:  


FOLLOW UP WITH YOUR PRIMARY CARE PHYSICIAN TOMORROW.Return to this facility if 


you are not improving as expected.











MIGDALIA LUCERO PA-C       Jan 24, 2019 09:54

## 2019-02-21 NOTE — ERD
ER Documentation


Chief Complaint


Chief Complaint





bib mother for fever and phlem





HPI


20  [month-old]  [male] coming in today.  Patient's parents indicate that the 


patient has been having: Cold symptoms





History of Present Illness: Mother brings patient in today with complaint of 


fever and phlegm.  Associated symptoms including vomiting, 2 episodes today.  


Mother reported patient tolerating p.o. fluids and milk.  Denies sick contacts. 


 Vaccinations up-to-date





Review of systems: All systems were reviewed and are negative except for what is


 indicated in the history of present illness.





Past Medical History: [Negative for hypertension, diabetes or other medical 


problems]; 





Social History: [Patient denies tobacco, alcohol, elicit drug use]; Social 


History: Lives with parents; [does not] attend /school.





Medications: [None] 





Allergies: [NKDA] 





Social Concerns: DeniesSocial History: Lives with parents.





ROS


All systems reviewed and are negative except as per history of present illness.





Medications


Home Meds


Active Scripts


Acetaminophen* (Acetaminophen* Susp) 160 Mg/5 Ml Oral.susp, 215 MG PO Q4H PRN 


for PAIN OR TEMP ABOVE 38C, #4 OZ


   Prov:CHRISTINE CHEUNG NP         2/20/19


Cetirizine Hcl* (Cetirizine Hcl*) 5 Mg/5 Ml Solution, 2.5 ML PO DAILY for ALL


ERGIES/COUGH/RUNNY NOSE, #4 OZ


   Prov:CHRISTINE CHEUNG NP         2/20/19


Acetaminophen* (Acetaminophen* Susp) 160 Mg/5 Ml Oral.susp, 5 ML PO Q4H PRN for 


PAIN OR FEVER MDD 5, #1 BOTTLE


   Prov:MIGDALIA LUCERO PA-C         1/24/19


Ibuprofen (Ibuprofen) 100 Mg/5 Ml Oral.susp, 5 ML PO Q6H PRN for PAIN AND OR 


ELEVATED TEMP, #4 OZ


   Prov:MIGDALIA LUCERO PA-C         1/24/19


Acetaminophen* (Acetaminophen* Susp) 160 Mg/5 Ml Oral.susp, 5 ML PO Q4H PRN for 


PAIN OR FEVER MDD 5, #1 BOTTLE


   Prov:JUHI ORELLANA MD         11/20/18


Acetaminophen* (Acetaminophen* Susp) 160 Mg/5 Ml Oral.susp, 5.5 ML PO Q4H PRN 


for PAIN OR FEVER MDD 5, #1 BOTTLE


   Prov:RAJESH ALVAREZAnuj PA-C         7/21/18


Ibuprofen (MOTRIN LIQUID (PED)) 20 Mg/Ml Susp, 6 ML PO Q6, #4 OZ


   Prov:PRORAJESH CANOAnuj PA-C         7/21/18


Electrolyte,Oral (Pedialyte) 1,000 Ml Solution, 100 ML PO Q6 PRN for DIARRHEA, 


#1000 ML


   Prov:PROBARRY CANOH NEREIDAAnuj PA-C         7/21/18


Electrolyte,Oral (Pedialyte) 1,000 Ml Solution, 100 ML PO Q6 PRN for VOMITTING, 


#1000 ML


   Prov:VINICIO DANIELS PA-C         3/23/18


Acetaminophen* (Acetaminophen* Susp) 160 Mg/5 Ml Oral.susp, 5 ML PO Q6H PRN for 


PAIN OR FEVER MDD 5, #1 BOTTLE


   Prov:VINICIO DANIELS PA-C         3/23/18


Ibuprofen (Ibuprofen) 100 Mg/5 Ml Oral.susp, 5 ML PO Q6H PRN for PAIN AND OR 


ELEVATED TEMP, #4 OZ


   Prov:VINICIO DANIELS PA-C         3/23/18


Electrolyte,Oral (Pedialyte) 1,000 Ml Solution, 100 ML PO Q6 PRN for VOMITTING, 


#1000 ML


   Prov:VINICIO DANIELS PA-C         1/5/18


Acetaminophen* (Acetaminophen* Susp) 160 Mg/5 Ml Oral.susp, 4.5 ML PO Q6H PRN 


for PAIN OR FEVER MDD 5, #1 BOTTLE


   Prov:VINICIO DANIELS PA-C         1/5/18


Ondansetron Hcl* (Ondansetron Hcl* Liq) 4 Mg/5 Ml Solution, 1 ML PO Q6H PRN for 


NAUSEA AND/OR VOMITING, #2 OZ


   Prov:ONIEL BACK NP         11/28/17


Naphazoline-Pheniramine* (Visine-A*) 15 Ml Drops, 2 DROP BOTH EYES Q4H PRN for 


RED EYES, #1 BOT


   Prov:ONIEL BACK NP         11/28/17


Polymyxin B Sulfate-TMP* (Polymyxin B-TMP Eye Drops*) 10 Ml Drops, 1 DROP BOTH 


EYES QID for 7 Days, EA


   Prov:ONIEL BACK NP         11/28/17


Acetaminophen* (Acetaminophen* Susp) 160 Mg/5 Ml Oral.susp, 4.5 ML PO Q4H PRN 


for PAIN OR FEVER MDD 5, #1 BOTTLE


   Prov:HOMERO SCHULTZ         11/26/17


Acetaminophen* (Acetaminophen* Susp) 160 Mg/5 Ml Oral.susp, 4 ML PO Q6H PRN for 


PAIN OR FEVER MDD 5, #1 BOTTLE


   Prov:VINICIO DANIELS PA-C         11/12/17


Electrolytes (Pedialyte Advanced Care) 1,000 Ml Solution, 100 ML PO TID, #1000 


ML


   Prov:ALICIA MARTÍNEZ PA-C         9/8/17


Acetaminophen* (Acetaminophen* Susp) 160 Mg/5 Ml Oral.susp, 3 ML PO Q4H PRN for 


PAIN OR FEVER MDD 5, #1 BOTTLE


   Prov:ALICIA MARTÍNEZ PA-C         9/8/17


Glycerin* (Glycerin (Pediatric)*) 1 Each Supp.rect, 1 EACH ID DAILY, #3 


SUPP.RECT


   Prov:OSCAR CARVALHO         7/9/17





Allergies


Allergies:  


Coded Allergies:  


     No Known Allergy (Unverified , 2/20/19)





PMhx/Soc


Medical and Surgical Hx:  pt denies Surgical Hx


History of Surgery:  No


Anesthesia Reaction:  No


Hx Neurological Disorder:  No


Hx Respiratory Disorders:  No


Hx Cardiac Disorders:  Yes (BRONCHITIS)


Hx Psychiatric Problems:  No


Hx Alcohol Use:  No


Hx Substance Use:  No


Hx Tobacco Use:  No


Smoking Status:  Never smoker





FmHx


Family History:  No diabetes, No coronary disease





Physical Exam


Vitals





Vital Signs


  Date      Temp  Pulse  Resp  B/P (MAP)  Pulse Ox  O2          O2 Flow     FiO2


Time                                                Delivery    Rate


   2/20/19  99.2


     21:00


   2/20/19  99.4    139    19                  100


     17:33





Physical Exam


Clear rhinorrhea const:   No acute distress


Head:   Atraumatic 


Eyes:    Normal Conjunctiva


ENT:    Normal External Ears, Nose and Mouth; clear rhinorrhea


Neck:               Full range of motion. No meningismus.


Resp:   Clear to auscultation bilaterally


Cardio:   Regular rate and rhythm, no murmurs


Abd:    Soft, non tender, non distended. Normal bowel sounds


Skin:   No petechiae or rashes


Back:   No midline or flank tenderness


Ext:    No cyanosis, or edema


Neur:   Awake and alert


Psych:    Normal Mood and Affect


Results 24 hrs





Current Medications


 Medications
   Dose
          Sig/Lupillo
       Start Time
   Status  Last


 (Trade)       Ordered        Route
 PRN     Stop Time              Admin
Dose


                              Reason                                Admin


                215 mg         ONCE  STAT
    2/20/19       DC           2/20/19


Acetaminophen                 PO
            19:31
                       19:37




  (Tylenol                                  2/20/19 19:32


Liquid



(Ped))








Procedures/MDM


ED course includes a thorough examination and history.  ED course includes 


antipyretics, influenza, and RSV testing.





This is an otherwise healthy, well appearing patient presenting with 


uncomplicated viral syndrome, as characterized by history, physical exam 


findings [lab findings].  Negative influenza and RSV testing





Patient is non-toxic well hydrated, tolerating oral intake.  No signs of 


respiratory distress. I have low suspicion for life-threatening medical 


emergency.





[Patient will be treated with outpatient supportive care; no indications for 


antibiotics at this time. Discussion of appropriate dosing and use of 


acetaminophen and ibuprofen for antipyresis with parents]





Parent educated on diagnoses, [prescriptions for cetirizine], follow-up care, 


strict return precautions or worsening condition. Discussed discharge instruct


ions and return precautions with parent(s) and have been advised for close 


follow up with PCP. Questions answered.





Disposition for discharge with followup in 2-3 days with PCP/clinic.





Departure


Diagnosis:  


   Primary Impression:  


   Viral syndrome


Condition:  Stable


Patient Instructions:  Viral Syndrome (Child), Allergic Rhinitis (Child)


Referrals:  


[facility]


Usted se ha hecho un examen mdico de control que le indica que no est en bryson 


condicin que requiera tratamiento urgente en el Departamento de Emergencia. Un 


estudio ms profundo y el tratamiento de gil condicin pueden esperar sin ningn 


riesgo hasta que usted sea atendida/o en el consultorio de gil mdico o bryson 


clnica. Es responsabilidad suya arreglar bryson eileen para el seguimiento del nivia.








MANEJO DE CONDICIONES NO URGENTES EN EL FUTURO


1) Si usted tiene un mdico de atencin primaria:





Usted debera llamar a gil mdico de atencin primaria antes de venir al 


departamento de emergencia. Despus de las horas de consultorio, gil doctor o gil 


asociado/a est disponible por telfono. El mdico o enfermero de juan en el 


servicio telefnico puede asesorarle por nash medio para atender el problema, o 


nivia contrario se puede programar bryson eileen.





2) Si usted no tiene un mdico de atencin primaria:


Llame al mdico o clnica de referencia que aparece abajo jesus manuel las horas de 


consultorio para hacer bryson eileen para que le vean.





CLINICAS:


[facility]  [phone] [address] [facility]  [phone] [address] [facility] [phone] [address] [place] [id number] [phone] [place] [id number] [phone] [facility]. [phone] [address] [facility]. [facility] ()


Usted se ha hecho un examen mdico de control que le indica que no est en bryson 


condicin que requiera tratamiento urgente en el Departamento de Emergencia. Un 


estudio ms profundo y el tratamiento de gil condicin pueden esperar sin ningn 


riesgo hasta que usted sea atendida/o en el consultorio de gil mdico o bryson 


clnica. Es responsabilidad suya arreglar bryson eileen para el seguimiento del nivia.








MANEJO DE CONDICIONES NO URGENTES EN EL FUTURO


1) Si usted tiene un mdico de atencin primaria:





Usted debera llamar a gil mdico de atencin primaria antes de venir al 


departamento de emergencia. Despus de las horas de consultorio, gil doctor o gil 


asociado/a est disponible por telfono. El mdico o enfermero de juan en el 


servicio telefnico puede asesorarle por nash medio para atender el problema, o 


nivia contrario se puede programar bryson eileen.





2) Si usted no tiene un mdico de atencin primaria:


Llame al mdico o condado institucions de referencia que aparece abajo jesus manuel 


las horas de consultorio para hacer bryson eileen para que le vean.








SI USTED NO PUEDE PAGAR PARA KIRTI UN MEDICO puede ir a:


[facility] 


[address] [place] [address]





[facility] 


[address] [place] [address]





[facility] 


[address] [place] [address]





[facility]


[place] [id number] [phone]





Additional Instructions:  


Call your primary care doctor TOMORROW for an appointment during the next 2-3 


days.See the doctor sooner or return here if your condition worsens before your 


appointment time.











CHRISTINE CHEUNG NP            Feb 21, 2019 01:47